# Patient Record
Sex: FEMALE | Race: WHITE | NOT HISPANIC OR LATINO
[De-identification: names, ages, dates, MRNs, and addresses within clinical notes are randomized per-mention and may not be internally consistent; named-entity substitution may affect disease eponyms.]

---

## 2017-10-02 PROBLEM — Z00.00 ENCOUNTER FOR PREVENTIVE HEALTH EXAMINATION: Status: ACTIVE | Noted: 2017-10-02

## 2017-10-17 ENCOUNTER — RX RENEWAL (OUTPATIENT)
Age: 22
End: 2017-10-17

## 2017-11-21 ENCOUNTER — RX RENEWAL (OUTPATIENT)
Age: 22
End: 2017-11-21

## 2017-11-28 ENCOUNTER — OTHER (OUTPATIENT)
Age: 22
End: 2017-11-28

## 2017-12-13 ENCOUNTER — RX RENEWAL (OUTPATIENT)
Age: 22
End: 2017-12-13

## 2018-03-12 ENCOUNTER — OTHER (OUTPATIENT)
Age: 23
End: 2018-03-12

## 2018-06-25 ENCOUNTER — OTHER (OUTPATIENT)
Age: 23
End: 2018-06-25

## 2018-10-23 ENCOUNTER — OTHER (OUTPATIENT)
Age: 23
End: 2018-10-23

## 2018-11-01 ENCOUNTER — OTHER (OUTPATIENT)
Age: 23
End: 2018-11-01

## 2018-11-02 ENCOUNTER — MEDICATION RENEWAL (OUTPATIENT)
Age: 23
End: 2018-11-02

## 2019-03-25 ENCOUNTER — MEDICATION RENEWAL (OUTPATIENT)
Age: 24
End: 2019-03-25

## 2019-04-16 ENCOUNTER — OTHER (OUTPATIENT)
Age: 24
End: 2019-04-16

## 2019-07-10 ENCOUNTER — OTHER (OUTPATIENT)
Age: 24
End: 2019-07-10

## 2019-08-23 ENCOUNTER — OTHER (OUTPATIENT)
Age: 24
End: 2019-08-23

## 2019-09-24 ENCOUNTER — OTHER (OUTPATIENT)
Age: 24
End: 2019-09-24

## 2019-09-24 ENCOUNTER — MOBILE ON CALL (OUTPATIENT)
Age: 24
End: 2019-09-24

## 2019-09-24 RX ORDER — LAMOTRIGINE 100 MG/1
100 TABLET ORAL TWICE DAILY
Qty: 120 | Refills: 11 | Status: DISCONTINUED | COMMUNITY
Start: 2017-11-21 | End: 2019-09-24

## 2020-07-27 NOTE — HISTORY OF PRESENT ILLNESS
[Home] : at home, [unfilled] , at the time of the visit. [Medical Office: ([facility])___] : at the medical office located in  [Verbal consent obtained from patient] : the patient, [unfilled] [FreeTextEntry1] : last week took meds and half hour to one hour later \par not clear whether inner ear issue or toxic on meds.\par LTG level ordered\par will call if any additional sxs [Time Spent: ___ minutes] : I have spent [unfilled] minutes with the patient on the telephone

## 2021-01-12 ENCOUNTER — APPOINTMENT (OUTPATIENT)
Dept: NEUROLOGY | Facility: CLINIC | Age: 26
End: 2021-01-12
Payer: MEDICAID

## 2021-01-12 VITALS
HEIGHT: 65 IN | SYSTOLIC BLOOD PRESSURE: 119 MMHG | WEIGHT: 169 LBS | DIASTOLIC BLOOD PRESSURE: 70 MMHG | BODY MASS INDEX: 28.16 KG/M2 | HEART RATE: 123 BPM

## 2021-01-12 VITALS — TEMPERATURE: 97 F

## 2021-01-12 PROCEDURE — 99072 ADDL SUPL MATRL&STAF TM PHE: CPT

## 2021-01-12 PROCEDURE — 99214 OFFICE O/P EST MOD 30 MIN: CPT

## 2021-01-12 NOTE — ASSESSMENT
[FreeTextEntry1] : A/P- \par 1. 24 y/o 20 weeks pregnant with auras only , tolerating LTG.\par - cont  bid, awaiting level\par - target LTG level is 7\par 2. Tachycardia- chronic, f/u with PMD\par \par RTC 6m

## 2021-01-12 NOTE — HISTORY OF PRESENT ILLNESS
[FreeTextEntry1] : cc- seizures\par \par HPI- 26 y/o RH woman 20 weeks pregnant with third child.\par Last event with possible LOC 3-4 y/a.\par Believes she has had occasional auras of feeling far off. Lasts about 1-2 mins. Can speak and understand during the event.\par \par meds-  bid (level 5.5)\par MVI-prenatal\par Folate\par Feso4\par acidophilus\par

## 2021-01-12 NOTE — PHYSICAL EXAM
[FreeTextEntry1] : Neuro- alert and oriented x3\par attn conc lang nl\par Cn intact in detail\par Motor 5/5\par sens wnl\par CSG wnl

## 2021-04-23 ENCOUNTER — NON-APPOINTMENT (OUTPATIENT)
Age: 26
End: 2021-04-23

## 2021-06-09 ENCOUNTER — NON-APPOINTMENT (OUTPATIENT)
Age: 26
End: 2021-06-09

## 2021-07-09 ENCOUNTER — RX RENEWAL (OUTPATIENT)
Age: 26
End: 2021-07-09

## 2021-07-09 RX ORDER — LAMOTRIGINE 150 MG/1
150 TABLET ORAL
Qty: 60 | Refills: 2 | Status: ACTIVE | COMMUNITY
Start: 2021-06-09 | End: 1900-01-01

## 2021-07-13 ENCOUNTER — APPOINTMENT (OUTPATIENT)
Dept: NEUROLOGY | Facility: CLINIC | Age: 26
End: 2021-07-13
Payer: MEDICAID

## 2021-07-13 PROCEDURE — 99443: CPT

## 2021-12-13 ENCOUNTER — RX RENEWAL (OUTPATIENT)
Age: 26
End: 2021-12-13

## 2022-01-13 ENCOUNTER — RX RENEWAL (OUTPATIENT)
Age: 27
End: 2022-01-13

## 2022-03-15 ENCOUNTER — APPOINTMENT (OUTPATIENT)
Dept: NEUROLOGY | Facility: CLINIC | Age: 27
End: 2022-03-15
Payer: MEDICAID

## 2022-03-15 ENCOUNTER — NON-APPOINTMENT (OUTPATIENT)
Age: 27
End: 2022-03-15

## 2022-03-15 PROCEDURE — 99442: CPT

## 2022-03-15 NOTE — HISTORY OF PRESENT ILLNESS
[Home] : at home, [unfilled] , at the time of the visit. [Medical Office: ([facility])___] : at the medical office located in  [Verbal consent obtained from patient] : the patient, [unfilled] [FreeTextEntry1] : HPI- Having frequent auras and ?side effects\par Has two autistic children\par \par \par meds-  bid- level 10\par MVI-prenatal\par Folate\par Feso4\par acidophilus\par \par A/P- \par Partial idiopathic epilepsy- wants to stay on present meds and try Ashley technique for stress\par O/w consider ER pill \par if goes on OCPs then needs labs after starts

## 2022-04-19 ENCOUNTER — APPOINTMENT (OUTPATIENT)
Dept: NEUROLOGY | Facility: CLINIC | Age: 27
End: 2022-04-19

## 2022-10-14 ENCOUNTER — RX RENEWAL (OUTPATIENT)
Age: 27
End: 2022-10-14

## 2023-04-26 ENCOUNTER — RX RENEWAL (OUTPATIENT)
Age: 28
End: 2023-04-26

## 2023-06-20 DIAGNOSIS — G40.109 LOCALIZATION-RELATED (FOCAL) (PARTIAL) SYMPTOMATIC EPILEPSY AND EPILEPTIC SYNDROMES WITH SIMPLE PARTIAL SEIZURES, NOT INTRACTABLE, W/OUT STATUS EPILEPTICUS: ICD-10-CM

## 2023-08-07 ENCOUNTER — APPOINTMENT (OUTPATIENT)
Dept: NEUROLOGY | Facility: CLINIC | Age: 28
End: 2023-08-07
Payer: MEDICAID

## 2023-08-07 PROCEDURE — 99443: CPT

## 2023-11-13 ENCOUNTER — RX RENEWAL (OUTPATIENT)
Age: 28
End: 2023-11-13

## 2024-01-22 ENCOUNTER — RX RENEWAL (OUTPATIENT)
Age: 29
End: 2024-01-22

## 2024-01-26 ENCOUNTER — APPOINTMENT (OUTPATIENT)
Dept: NEUROLOGY | Facility: CLINIC | Age: 29
End: 2024-01-26

## 2024-01-29 ENCOUNTER — APPOINTMENT (OUTPATIENT)
Dept: NEUROLOGY | Facility: CLINIC | Age: 29
End: 2024-01-29

## 2024-02-05 ENCOUNTER — RX RENEWAL (OUTPATIENT)
Age: 29
End: 2024-02-05

## 2024-02-15 ENCOUNTER — RX RENEWAL (OUTPATIENT)
Age: 29
End: 2024-02-15

## 2024-02-28 ENCOUNTER — APPOINTMENT (OUTPATIENT)
Dept: NEUROLOGY | Facility: CLINIC | Age: 29
End: 2024-02-28

## 2024-02-29 ENCOUNTER — APPOINTMENT (OUTPATIENT)
Dept: NEUROLOGY | Facility: CLINIC | Age: 29
End: 2024-02-29

## 2024-04-05 ENCOUNTER — RX RENEWAL (OUTPATIENT)
Age: 29
End: 2024-04-05

## 2024-04-28 ENCOUNTER — RX RENEWAL (OUTPATIENT)
Age: 29
End: 2024-04-28

## 2024-04-28 RX ORDER — LAMOTRIGINE 100 MG/1
100 TABLET ORAL
Qty: 60 | Refills: 5 | Status: ACTIVE | COMMUNITY
Start: 2019-09-24 | End: 1900-01-01

## 2024-05-09 ENCOUNTER — RX RENEWAL (OUTPATIENT)
Age: 29
End: 2024-05-09

## 2024-05-10 ENCOUNTER — RX RENEWAL (OUTPATIENT)
Age: 29
End: 2024-05-10

## 2024-05-10 RX ORDER — LAMOTRIGINE 25 MG/1
25 TABLET ORAL
Qty: 90 | Refills: 3 | Status: ACTIVE | COMMUNITY
Start: 2019-09-24 | End: 1900-01-01

## 2024-09-04 ENCOUNTER — RX RENEWAL (OUTPATIENT)
Age: 29
End: 2024-09-04

## 2024-10-28 ENCOUNTER — RX RENEWAL (OUTPATIENT)
Age: 29
End: 2024-10-28

## 2024-12-23 ENCOUNTER — RX RENEWAL (OUTPATIENT)
Age: 29
End: 2024-12-23

## 2025-05-19 ENCOUNTER — RX RENEWAL (OUTPATIENT)
Age: 30
End: 2025-05-19

## 2025-05-30 ENCOUNTER — RX RENEWAL (OUTPATIENT)
Age: 30
End: 2025-05-30

## 2025-07-14 ENCOUNTER — RX RENEWAL (OUTPATIENT)
Age: 30
End: 2025-07-14